# Patient Record
(demographics unavailable — no encounter records)

---

## 2025-07-30 NOTE — DISCUSSION/SUMMARY
[Normal Growth] : growth [Normal Development] : developmental [No Elimination Concerns] : elimination [Continue Regimen] : feeding [No Skin Concerns] : skin [Normal Sleep Pattern] : sleep [None] : no known medical problems [Anticipatory Guidance Given] : Anticipatory guidance addressed as per the history of present illness section [ Transition] :  transition [ Care] :  care [Nutritional Adequacy] : nutritional adequacy [Parental Well-Being] : parental well-being [Safety] : safety [No Vaccines] : no vaccines needed [No Medications] : ~He/She~ is not on any medications [Parent/Guardian] : Parent/Guardian [FreeTextEntry1] : Stevie is a 15 d M here for first WCC, born at [facility] and discharged from NICU on  (2 days ago). Overall mother has no concerns, doing well and feeding Enfamil/BF. He was sent home from NICU with supplemental O2 for HR <80 or >200 or saturation <80% - hasnt needed to use in the last 2 days but hasnt used monitors (HR or pulse ox). Has follow up appt with Dr Kumar on  in the cardiology clinic. No concern for color changes during feeds or increased work of breathing. Genetics team will call with results from testing. Mother interested in circumcision, given urology information.   Follow up in 2 weeks for 1 mo WCC or sooner if ill or concerns.   PPD screening ne SWYC positive for needs, patient navigator visit today Nursing visit for lactation support today   #Health Maintenance  Recommend exclusive breastfeeding, 8-12 feedings per day. Mother should continue prenatal vitamins and avoid alcohol. If formula is needed, recommend iron-fortified formulations every 2-3 hrs. When in car, patient should be in rear-facing car seat in back seat. Air dry umbilical stump. Put baby to sleep on back, in own crib with no loose or soft bedding. Limit baby's exposure to others, especially those with fever or unknown vaccine status.  SDOH domains were screened and scored - concerns noted. Patient navigator/CHW consulted and spoke with patient and family.

## 2025-07-30 NOTE — PHYSICAL EXAM
[Alert] : alert [Normocephalic] : normocephalic [Flat Open Anterior [place]] : flat open anterior fontanelle [PERRL] : PERRL [Red Reflex Bilateral] : red reflex bilateral [Normally Placed Ears] : normally placed ears [Auricles Well Formed] : auricles well formed [Clear Tympanic membranes] : clear tympanic membranes [Light reflex present] : light reflex present [Bony structures visible] : bony structures visible [Patent Auditory Canal] : patent auditory canal [Nares Patent] : nares patent [Palate Intact] : palate intact [Uvula Midline] : uvula midline [Supple, full passive range of motion] : supple, full passive range of motion [Symmetric Chest Rise] : symmetric chest rise [Clear to Auscultation Bilaterally] : clear to auscultation bilaterally [Regular Rate and Rhythm] : regular rate and rhythm [S1, S2 present] : S1, S2 present [+2 Femoral Pulses] : +2 femoral pulses [Soft] : soft [Bowel Sounds] : bowel sounds present [Umbilical Stump Dry, Clean, Intact] : umbilical stump dry, clean, intact [Normal external genitailia] : normal external genitalia [Central Urethral Opening] : central urethral opening [Testicles Descended Bilaterally] : testicles descended bilaterally [Patent] : patent [Normally Placed] : normally placed [No Abnormal Lymph Nodes Palpated] : no abnormal lymph nodes palpated [Symmetric Flexed Extremities] : symmetric flexed extremities [Startle Reflex] : startle reflex present [Suck Reflex] : suck reflex present [Rooting] : rooting reflex present [Palmar Grasp] : palmar grasp present [Plantar Grasp] : plantar reflex present [Symmetric Deep] : symmetric [place] [Acute Distress] : no acute distress [Icteric sclera] : nonicteric sclera [Discharge] : no discharge [Palpable Masses] : no palpable masses [Murmurs] : no murmurs [Tender] : nontender [Distended] : not distended [Hepatomegaly] : no hepatomegaly [Splenomegaly] : no splenomegaly [Circumcised] : not circumcised [Vaz-Ortolani] : negative Vaz-Ortolani [Spinal Dimple] : no spinal dimple [Tuft of Hair] : no tuft of hair [Jaundice] : not jaundice

## 2025-07-30 NOTE — HISTORY OF PRESENT ILLNESS
[Breast milk] : breast milk [Formula ___ oz/feed] : [unfilled] oz of formula per feed [Normal] : Normal [Frequency of stools: ___] : Frequency of stools: [unfilled]  stools [Yellow] : yellow [Seedy] : seedy [In Bassinet/Crib] : sleeps in bassinet/crib [On back] : sleeps on back [No] : No cigarette smoke exposure [Rear facing car seat in back seat] : Rear facing car seat in back seat [Carbon Monoxide Detectors] : Carbon monoxide detectors at home [Smoke Detectors] : Smoke detectors at home. [Hepatitis B Vaccine Given] : Hepatitis B vaccine given [Other: _____] : at [unfilled] [BW: _____] : weight of [unfilled] [DW: _____] : Discharge weight was [unfilled] [Pacifier] : Uses pacifier [Vitamins ___] : Patient takes no vitamins [Co-sleeping] : no co-sleeping [Loose bedding, pillow, toys, and/or bumpers in crib] : no loose bedding, pillow, toys, and/or bumpers in crib [FreeTextEntry8] : With almost every feed [de-identified] : 7/17 [FreeTextEntry1] : Born at [facility], mother thinks ~39 weeks (due date 7/17 but induced 7/14, delivered 7/15) -Unsure of maternal labs, mom noted she was tested but declined sharing results  NICU- PPV and CPAP at birth, weaned to HFNC for desaturations continuing, then RA  Genetics: -Invitae panel - 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  Cardio: -Biventricular dysfunction  -Small and dysfunctional RV- initially found on fetal echo -Echo: ASD, moderately depressed RV function, hypoplastic RV mildly, LV appears globular towards the apex due to smaller RV, LV mildly decreased -BNP downtrend to normal during NICU stay  SLP: -Regained weight, normal nipple on d/c  Discharge: - Pulse ox and HR monitor @ home - Monitor for HR <80 or >200 for >30 seconds - 0.5 L LFNC for <80% O2 sat   FH: Mother denies cardiac hx, Father is 46 y/o with "heart problems" but unsure of dx and if on medicines No hx of SCD in family members <39 y/o  Interval hx since d/c on 7/28 - Has not had to use supplemental O2, mother noted she didnt put monitors on yesterday - Feeding well, no concerns for reflux or spit ups - No meds - f/u Dr Kumar [facility] [place] [facility] 8/1

## 2025-07-30 NOTE — DEVELOPMENTAL MILESTONES
[Makes brief eye contact] : makes brief eye contact [Cries with discomfort] : cries with discomfort [Calms to adult voice] : calms to adult voice [Reflexively moves arms and legs] : reflexively moves arms and legs [Turns head to side when on stomach] : turns head to side when on stomach [Holds fingers closed] : holds fingers closed [Grasps reflexively] : grasp reflexively [Normal Development] : Normal Development [Passed] : passed [FreeTextEntry2] : 5